# Patient Record
Sex: MALE | Race: WHITE | NOT HISPANIC OR LATINO | URBAN - NONMETROPOLITAN AREA
[De-identification: names, ages, dates, MRNs, and addresses within clinical notes are randomized per-mention and may not be internally consistent; named-entity substitution may affect disease eponyms.]

---

## 2019-05-30 ENCOUNTER — IMPORTED ENCOUNTER (OUTPATIENT)
Dept: URBAN - NONMETROPOLITAN AREA CLINIC 1 | Facility: CLINIC | Age: 84
End: 2019-05-30

## 2019-05-30 PROBLEM — H26.493: Noted: 2019-05-30

## 2019-05-30 PROBLEM — H04.123: Noted: 2019-05-30

## 2019-05-30 PROBLEM — H35.3131: Noted: 2019-05-30

## 2019-05-30 PROBLEM — H40.013: Noted: 2019-05-30

## 2019-05-30 PROBLEM — Z96.1: Noted: 2019-05-30

## 2019-05-30 PROBLEM — H35.372: Noted: 2019-05-30

## 2019-05-30 PROCEDURE — 92012 INTRM OPH EXAM EST PATIENT: CPT

## 2019-05-30 NOTE — PATIENT DISCUSSION
Pseudophakia OU w/PCO-  discussed findings w/patient-  trace PCO noted OU not yet surgical -  continue to monitor 6 months or prn Glaucoma Suspect -  discussed findings w/patient-  based on C/D asymmetry -  IOP's 12 13-  C/D 0.85 OD and 0.55 OS-  need to obtain baseline 24-2 VF -  continue without drops at this time -  monitor 6 month f/u POAG-S w/ 24-2 VF before next visitARMD OU/ERM OS-  discussed findings w/patient-  no known family history -  continue AG use daily patient to call or come in if changes in vision are noted from today -  continue MVT daily-  monitor 6 month or rpn ALEX OU -  discussed findings w/patient-  continue Restasis QD OU-  continue Systane PRN OU -  monitor as scheduled or prn; 's Notes: MAC OCT - 11/30/18DFE- 11/30/18

## 2019-11-07 ENCOUNTER — IMPORTED ENCOUNTER (OUTPATIENT)
Dept: URBAN - NONMETROPOLITAN AREA CLINIC 1 | Facility: CLINIC | Age: 84
End: 2019-11-07

## 2019-11-07 PROCEDURE — 92083 EXTENDED VISUAL FIELD XM: CPT

## 2019-11-07 NOTE — PATIENT DISCUSSION
Testing Only-VF 24-2OD: R scattered scotoma non-specific defectsOS: R scattered scotoma non-specific defects maybe early sup nasal stepBaseline testing done today there is not a VF in the EHR; 's Notes: MRDFE 11/30/2018MAC OCT - 11/30/1824-2 VF 11/7/2019

## 2019-11-15 ENCOUNTER — IMPORTED ENCOUNTER (OUTPATIENT)
Dept: URBAN - NONMETROPOLITAN AREA CLINIC 1 | Facility: CLINIC | Age: 84
End: 2019-11-15

## 2019-11-15 PROCEDURE — 99213 OFFICE O/P EST LOW 20 MIN: CPT

## 2019-11-15 NOTE — PATIENT DISCUSSION
Pseudophakia OU w/PCO-  discussed findings w/patient-  trace PCO noted OU not yet surgical -  continue to monitor 6 months or prn Glaucoma Suspect -  discussed findings w/patient-  no family history per patient -  based on C/D asymmetry -  IOP's 14 OU- watch for superior thinning OD - OD: R scattered scotoma non-specific defects- OS: R scattered scotoma non-specific defects maybe early sup nasal step-  C/D 0.85 OD and 0.55 OS-  continue without drops at this time ARMD OU/ERM OS-  discussed findings w/patient-  no known family history -  continue AG use daily patient to call or come in if changes in vision are noted from today -  continue MVT daily-  monitor 6 month or rpn ALEX OU -  discussed findings w/patient-  continue Restasis QD OU-  continue Systane PRN OU -  monitor as scheduled or prn; 's Notes: MRDFE 11/30/2018MAC OCT - 11/30/1824-2 VF 11/7/2019.

## 2022-04-09 ASSESSMENT — TONOMETRY
OD_IOP_MMHG: 14
OD_IOP_MMHG: 12
OS_IOP_MMHG: 13
OS_IOP_MMHG: 14

## 2022-04-09 ASSESSMENT — VISUAL ACUITY
OD_CC: 20/25-2
OS_GLARE: 20/30
OD_GLARE: 20/30
OS_CC: 20/30-1
OS_CC: 20/25
OD_CC: 20/25

## 2022-05-25 ENCOUNTER — COMPREHENSIVE EXAM (OUTPATIENT)
Dept: URBAN - NONMETROPOLITAN AREA CLINIC 4 | Facility: CLINIC | Age: 87
End: 2022-05-25

## 2022-05-25 DIAGNOSIS — H35.372: ICD-10-CM

## 2022-05-25 DIAGNOSIS — H40.013: ICD-10-CM

## 2022-05-25 DIAGNOSIS — H35.3131: ICD-10-CM

## 2022-05-25 DIAGNOSIS — H04.123: ICD-10-CM

## 2022-05-25 PROCEDURE — 99214 OFFICE O/P EST MOD 30 MIN: CPT

## 2022-05-25 ASSESSMENT — TONOMETRY
OD_IOP_MMHG: 15
OS_IOP_MMHG: 14

## 2022-05-25 ASSESSMENT — VISUAL ACUITY
OS_SC: 20/30+1
OD_SC: 20/30+2